# Patient Record
(demographics unavailable — no encounter records)

---

## 2025-06-02 NOTE — HISTORY OF PRESENT ILLNESS
[Never] : never [TextBox_4] : He is a 33-year-old man with an elevated BMI who is applying for employment with the [employer] department.  He is required to be evaluated for sleep disordered breathing.  He denied any history of pulmonary problems.  He never smoked.  He denies any history of sleep related breathing problems, snoring, witnessed apneas or daytime somnolence.  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  He has been started on medications for weight loss. [Daytime Somnolence] : denies daytime somnolence [Nonrestorative Sleep] : denies nonrestorative sleep [Snoring] : no snoring

## 2025-06-02 NOTE — REVIEW OF SYSTEMS
[Obesity] : obesity [Fatigue] : no fatigue [Poor Appetite] : no poor appetite [Sinus Problems] : no sinus problems [Cough] : no cough [Dyspnea] : no dyspnea [Wheezing] : no wheezing [Chest Discomfort] : no chest discomfort [GERD] : no gerd [Back Pain] : no back pain [Rash] : no rash [Anemia] : no anemia [Headache] : no headache [Anxiety] : no anxiety

## 2025-06-02 NOTE — PHYSICAL EXAM
[No Acute Distress] : no acute distress [IV] : Mallampati Class: IV [Normal Appearance] : normal appearance [Neck Circumference: ___] : neck circumference: [unfilled] [Normal Rate/Rhythm] : normal rate/rhythm [Normal S1, S2] : normal s1, s2 [No Resp Distress] : no resp distress [Clear to Auscultation Bilaterally] : clear to auscultation bilaterally [No Abnormalities] : no abnormalities [Normal Gait] : normal gait [No Cyanosis] : no cyanosis [No Edema] : no edema [Normal Turgor] : normal turgor [No Focal Deficits] : no focal deficits [Oriented x3] : oriented x3 [Normal Affect] : normal affect

## 2025-06-02 NOTE — DISCUSSION/SUMMARY
[FreeTextEntry1] : Impression At risk for sleep disordered breathing Elevated BMI History of asthma in the remote past - Has been prescribed albuterol but has not been using it  Recommend Obtain home sleep study Weight loss if possible PFT if he starts to use albuterol again Further recommendations to follow  Time spent interviewing the patient, performing an examination, reviewing pertinent data imaging, discussing the issues and recommendations was 45 minutes excluding other billable services.

## 2025-06-02 NOTE — HISTORY OF PRESENT ILLNESS
[Never] : never [TextBox_4] : He is a 33-year-old man with an elevated BMI who is applying for employment with the [employer] department.  He is required to be evaluated for sleep disordered breathing.  He denied any history of pulmonary problems.  He never smoked.  He denies any history of sleep related breathing problems, snoring, witnessed apneas or daytime somnolence.  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  He has been started on medications for weight loss. [Daytime Somnolence] : denies daytime somnolence [Nonrestorative Sleep] : denies nonrestorative sleep [Snoring] : no snoring